# Patient Record
Sex: MALE | Race: WHITE | NOT HISPANIC OR LATINO | Employment: OTHER | URBAN - METROPOLITAN AREA
[De-identification: names, ages, dates, MRNs, and addresses within clinical notes are randomized per-mention and may not be internally consistent; named-entity substitution may affect disease eponyms.]

---

## 2017-05-25 ENCOUNTER — OFFICE VISIT (OUTPATIENT)
Dept: OPHTHALMOLOGY | Facility: CLINIC | Age: 72
End: 2017-05-25
Payer: MEDICARE

## 2017-05-25 DIAGNOSIS — H52.4 HYPEROPIA WITH ASTIGMATISM AND PRESBYOPIA, BILATERAL: ICD-10-CM

## 2017-05-25 DIAGNOSIS — H52.03 HYPEROPIA WITH ASTIGMATISM AND PRESBYOPIA, BILATERAL: ICD-10-CM

## 2017-05-25 DIAGNOSIS — H52.203 HYPEROPIA WITH ASTIGMATISM AND PRESBYOPIA, BILATERAL: ICD-10-CM

## 2017-05-25 DIAGNOSIS — H25.13 NUCLEAR SCLEROSIS, BILATERAL: Primary | ICD-10-CM

## 2017-05-25 PROCEDURE — 92014 COMPRE OPH EXAM EST PT 1/>: CPT | Mod: S$PBB,,, | Performed by: OPTOMETRIST

## 2017-05-25 PROCEDURE — 92015 DETERMINE REFRACTIVE STATE: CPT | Mod: ,,, | Performed by: OPTOMETRIST

## 2017-05-25 PROCEDURE — 99211 OFF/OP EST MAY X REQ PHY/QHP: CPT | Mod: PBBFAC,PO | Performed by: OPTOMETRIST

## 2017-05-25 PROCEDURE — 99999 PR PBB SHADOW E&M-EST. PATIENT-LVL I: CPT | Mod: PBBFAC,,, | Performed by: OPTOMETRIST

## 2017-05-25 RX ORDER — ROSUVASTATIN CALCIUM 5 MG
TABLET ORAL
COMMUNITY
Start: 2017-04-07

## 2017-05-25 RX ORDER — AMLODIPINE BESYLATE 5 MG/1
TABLET ORAL
COMMUNITY
Start: 2017-03-21

## 2017-05-25 RX ORDER — TRAMADOL HYDROCHLORIDE 50 MG/1
TABLET ORAL
COMMUNITY
Start: 2017-03-10

## 2017-05-25 RX ORDER — LISINOPRIL 20 MG/1
TABLET ORAL
COMMUNITY
Start: 2017-04-30

## 2017-05-25 RX ORDER — MECLIZINE HYDROCHLORIDE 25 MG/1
TABLET ORAL
COMMUNITY
Start: 2017-05-18

## 2017-05-25 RX ORDER — METOPROLOL SUCCINATE 50 MG/1
TABLET, EXTENDED RELEASE ORAL
COMMUNITY
Start: 2017-05-07

## 2017-05-25 RX ORDER — RIVAROXABAN 20 MG/1
TABLET, FILM COATED ORAL
COMMUNITY
Start: 2017-05-02

## 2017-05-25 NOTE — PROGRESS NOTES
HPI     Eye Exam    Additional comments: Yearly           Comments   Last seen by DNL on 5/4/16 for full exam.  1. Nuclear sclerosis OU  2. Hyperopia with astigmatism and presbyopia, unspecified laterality  HPI    Any vision changes since last exam: no  Eye pain: no  Other ocular symptoms: no    Do you wear currently wear glasses or contacts? Glasses    Interested in contacts today? No    Do you plan on getting new glasses today? If needed         Last edited by Marilu Mendoza, PCT on 5/25/2017  1:39 PM. (History)              Assessment /Plan     For exam results, see Encounter Report.    Nuclear sclerosis, bilateral  Good va OU with specs  Pt is currently asymptomatic   Monitor 12 months    Hyperopia with astigmatism and presbyopia, unspecified laterality  Eyeglass Final Rx     Eyeglass Final Rx       Sphere Cylinder Axis Dist VA    Right +2.25 +0.75 175 20/20    Left +2.75 +0.50 170 20/20    Type:  [facility]    Expiration Date:  5/26/2018    Comments:  Distance only          Eyeglass Final Rx #2       Sphere Cylinder Axis Dist VA    Right +4.50 +0.75 175 20/20    Left +4.75 +0.50 170 20/20    Type:  L    Expiration Date:  5/26/2018    Comments:  Reading only              RTC 1 yr for dilated eye exam or PRN if any problems.   Discussed above and answered questions.

## 2018-05-31 ENCOUNTER — OFFICE VISIT (OUTPATIENT)
Dept: OPHTHALMOLOGY | Facility: CLINIC | Age: 73
End: 2018-05-31
Payer: MEDICARE

## 2018-05-31 DIAGNOSIS — H25.13 NUCLEAR SCLEROSIS, BILATERAL: Primary | ICD-10-CM

## 2018-05-31 DIAGNOSIS — H52.4 HYPEROPIA WITH ASTIGMATISM AND PRESBYOPIA, BILATERAL: ICD-10-CM

## 2018-05-31 DIAGNOSIS — H52.03 HYPEROPIA WITH ASTIGMATISM AND PRESBYOPIA, BILATERAL: ICD-10-CM

## 2018-05-31 DIAGNOSIS — H52.203 HYPEROPIA WITH ASTIGMATISM AND PRESBYOPIA, BILATERAL: ICD-10-CM

## 2018-05-31 PROCEDURE — 99211 OFF/OP EST MAY X REQ PHY/QHP: CPT | Mod: PBBFAC,PO | Performed by: OPTOMETRIST

## 2018-05-31 PROCEDURE — 92014 COMPRE OPH EXAM EST PT 1/>: CPT | Mod: S$PBB,,, | Performed by: OPTOMETRIST

## 2018-05-31 PROCEDURE — 99999 PR PBB SHADOW E&M-EST. PATIENT-LVL I: CPT | Mod: PBBFAC,,, | Performed by: OPTOMETRIST

## 2018-05-31 PROCEDURE — 92015 DETERMINE REFRACTIVE STATE: CPT | Mod: ,,, | Performed by: OPTOMETRIST

## 2018-05-31 NOTE — PROGRESS NOTES
HPI     Pts last exam was 5/25/17 with DNL. PT c/o overall blurred va and wears   gls full time.   HPI    Any vision changes since last exam: no  Eye pain: no  Other ocular symptoms: itching OU    Do you wear currently wear glasses or contacts? gls    Interested in contacts today? no    Do you plan on getting new glasses today? If needed      Last edited by Mansi Traylor MA on 5/31/2018  1:48 PM. (History)            Assessment /Plan     For exam results, see Encounter Report.    Nuclear sclerosis, bilateral  Surgery is not indicated at this time. Monitor 12 months.    Hyperopia with astigmatism and presbyopia, bilateral  Eyeglass Final Rx     Eyeglass Final Rx       Sphere Cylinder Axis    Right +2.00 +0.50 175    Left +2.75 +0.50 165    Type:  SVL    Expiration Date:  6/1/2019    Comments:  distance          Eyeglass Final Rx #2       Sphere Cylinder Axis    Right +4.50 +0.50 175    Left +4.75 +0.50 165    Type:  SVL    Expiration Date:  6/1/2019    Comments:  reading              Zaditor bid OU for itchy eyes, RTC PRN if symptoms worsen    RTC 1 yr for dilated eye exam or PRN if any problems.   Discussed above and answered questions.

## 2019-06-26 ENCOUNTER — OFFICE VISIT (OUTPATIENT)
Dept: OPHTHALMOLOGY | Facility: CLINIC | Age: 74
End: 2019-06-26
Payer: MEDICARE

## 2019-06-26 DIAGNOSIS — H52.03 HYPEROPIA WITH ASTIGMATISM AND PRESBYOPIA, BILATERAL: ICD-10-CM

## 2019-06-26 DIAGNOSIS — H52.203 HYPEROPIA WITH ASTIGMATISM AND PRESBYOPIA, BILATERAL: ICD-10-CM

## 2019-06-26 DIAGNOSIS — H52.4 HYPEROPIA WITH ASTIGMATISM AND PRESBYOPIA, BILATERAL: ICD-10-CM

## 2019-06-26 DIAGNOSIS — H25.13 NUCLEAR SCLEROSIS, BILATERAL: Primary | ICD-10-CM

## 2019-06-26 PROCEDURE — 92014 PR EYE EXAM, EST PATIENT,COMPREHESV: ICD-10-PCS | Mod: S$PBB,,, | Performed by: OPTOMETRIST

## 2019-06-26 PROCEDURE — 92015 DETERMINE REFRACTIVE STATE: CPT | Mod: ,,, | Performed by: OPTOMETRIST

## 2019-06-26 PROCEDURE — 99211 OFF/OP EST MAY X REQ PHY/QHP: CPT | Mod: PBBFAC | Performed by: OPTOMETRIST

## 2019-06-26 PROCEDURE — 92015 PR REFRACTION: ICD-10-PCS | Mod: ,,, | Performed by: OPTOMETRIST

## 2019-06-26 PROCEDURE — 92014 COMPRE OPH EXAM EST PT 1/>: CPT | Mod: S$PBB,,, | Performed by: OPTOMETRIST

## 2019-06-26 PROCEDURE — 99999 PR PBB SHADOW E&M-EST. PATIENT-LVL I: ICD-10-PCS | Mod: PBBFAC,,, | Performed by: OPTOMETRIST

## 2019-06-26 PROCEDURE — 99999 PR PBB SHADOW E&M-EST. PATIENT-LVL I: CPT | Mod: PBBFAC,,, | Performed by: OPTOMETRIST

## 2019-06-26 NOTE — PROGRESS NOTES
HPI     HPI    Any vision changes since last exam: none  Eye pain: none  Other ocular symptoms: none    Do you wear currently wear glasses or contacts? glasses    Interested in contacts today? no    Do you plan on getting new glasses today? No    The patient states his eyes are doing good since his last exam.      Last edited by Judie Bains on 6/26/2019  8:20 AM. (History)            Assessment /Plan     For exam results, see Encounter Report.    Nuclear sclerosis, bilateral  Cat stable with good va  Pt has no symptoms at this time  Monitor 12 months    Hyperopia with astigmatism and presbyopia, bilateral  Eyeglass Final Rx     Eyeglass Final Rx       Sphere Cylinder Axis    Right +2.00 +0.50 180    Left +2.50 +0.50 165    Type:  SVL    Expiration Date:  6/26/2020          Eyeglass Final Rx #2       Sphere Cylinder Axis    Right +4.50 +0.50 180    Left +4.75 +0.50 165    Type:  SVL    Expiration Date:  6/26/2020    Comments:  reading                  RTC 1 yr for dilated eye exam or PRN if any problems.   Discussed above and answered questions.

## 2025-02-07 ENCOUNTER — TELEPHONE (OUTPATIENT)
Dept: OPHTHALMOLOGY | Facility: CLINIC | Age: 80
End: 2025-02-07
Payer: MEDICARE

## 2025-02-07 NOTE — TELEPHONE ENCOUNTER
----- Message from Summer sent at 2/7/2025  1:06 PM CST -----  Contact: Crissy/wife  Type:  Sooner Apoointment Request    Caller is requesting a sooner appointment.  Caller declined first available appointment listed below.  Caller will not accept being placed on the waitlist and is requesting a message be sent to doctor.  Name of Caller:Crissy/wife   When is the first available appointment?Unknown  Symptoms:Yearly eye exam   Would the patient rather a call back or a response via [facility]? Call back  Best Call Back Number:[phone] or [phone]  Additional Information: [id number] [id number]

## 2025-05-30 ENCOUNTER — OFFICE VISIT (OUTPATIENT)
Dept: OPHTHALMOLOGY | Facility: CLINIC | Age: 80
End: 2025-05-30
Payer: MEDICARE

## 2025-05-30 DIAGNOSIS — H52.03 HYPEROPIA WITH ASTIGMATISM AND PRESBYOPIA, BILATERAL: ICD-10-CM

## 2025-05-30 DIAGNOSIS — H52.203 HYPEROPIA WITH ASTIGMATISM AND PRESBYOPIA, BILATERAL: ICD-10-CM

## 2025-05-30 DIAGNOSIS — H25.13 NUCLEAR SCLEROSIS, BILATERAL: Primary | ICD-10-CM

## 2025-05-30 DIAGNOSIS — H25.013 CORTICAL AGE-RELATED CATARACT OF BOTH EYES: ICD-10-CM

## 2025-05-30 DIAGNOSIS — H52.4 HYPEROPIA WITH ASTIGMATISM AND PRESBYOPIA, BILATERAL: ICD-10-CM

## 2025-05-30 PROCEDURE — 99212 OFFICE O/P EST SF 10 MIN: CPT | Mod: PBBFAC | Performed by: OPTOMETRIST

## 2025-05-30 PROCEDURE — 99999 PR PBB SHADOW E&M-EST. PATIENT-LVL II: CPT | Mod: PBBFAC,,, | Performed by: OPTOMETRIST

## 2025-05-30 NOTE — PROGRESS NOTES
HPI     Annual Exam            Comments: Pt reports for annual exam. No pain or irritation. No flashes   of light or floaters. No changes in vision.           Comments    1. Nuclear sclerosis OU  2. Hyperopia with astigmatism and presbyopia, unspecified laterality             Last edited by Gilda Santo on 5/30/2025  1:10 PM.            Assessment /Plan     For exam results, see Encounter Report.    Nuclear sclerosis, bilateral  Cortical age-related cataract of both eyes  Visually significant cataract.  Patient is at the option stage.   Cataract surgery will improve vision, but necessity is dependent on patient's symptoms.   Pt declines surgical consult at this time, would like to wait 6 months.    Hyperopia with astigmatism and presbyopia, bilateral  Eyeglass Final Rx       Eyeglass Final Rx         Sphere Cylinder Axis    Right +1.50 +0.50 180    Left +2.50 +0.50 165      Type: SVL distance    Expiration Date: 5/30/2026              Eyeglass Final Rx #2         Sphere Cylinder Axis    Right +4.00 +0.50 180    Left +5.00 +0.50 165      Type: SVL reading    Expiration Date: 5/30/2026                  Will schedule cataract evaluation with ophth in 6months  Pt to call/message with name of surgeon that his wife saw  Discussed above and all questions were answered.